# Patient Record
Sex: FEMALE | Race: WHITE | NOT HISPANIC OR LATINO | URBAN - METROPOLITAN AREA
[De-identification: names, ages, dates, MRNs, and addresses within clinical notes are randomized per-mention and may not be internally consistent; named-entity substitution may affect disease eponyms.]

---

## 2020-12-23 ENCOUNTER — OFFICE VISIT (OUTPATIENT)
Dept: URBAN - METROPOLITAN AREA TELEHEALTH 2 | Facility: TELEHEALTH | Age: 55
End: 2020-12-23

## 2020-12-23 RX ORDER — SPIRONOLACTONE 50 MG/1
TABLET, FILM COATED ORAL
Qty: 0 | Refills: 0 | Status: ACTIVE | COMMUNITY
Start: 1900-01-01 | End: 1900-01-01

## 2020-12-23 RX ORDER — LEVOTHYROXINE SODIUM 137 UG/1
TABLET ORAL
Qty: 0 | Refills: 0 | Status: ACTIVE | COMMUNITY
Start: 1900-01-01 | End: 1900-01-01

## 2020-12-23 RX ORDER — ATORVASTATIN CALCIUM 10 MG/1
TABLET, FILM COATED ORAL
Qty: 0 | Refills: 0 | Status: ACTIVE | COMMUNITY
Start: 1900-01-01 | End: 1900-01-01

## 2021-01-21 ENCOUNTER — OFFICE VISIT (OUTPATIENT)
Dept: URBAN - METROPOLITAN AREA CLINIC 98 | Facility: CLINIC | Age: 56
End: 2021-01-21
Payer: COMMERCIAL

## 2021-01-21 DIAGNOSIS — Z00.00 ROUTINE CHECK-UP: ICD-10-CM

## 2021-01-21 DIAGNOSIS — Z76.0 MEDICATION REFILL: ICD-10-CM

## 2021-01-21 DIAGNOSIS — C73 THYROID CANCER: ICD-10-CM

## 2021-01-21 DIAGNOSIS — K50.90 CROHN DISEASE: ICD-10-CM

## 2021-01-21 PROBLEM — 363478007 THYROID CANCER: Status: ACTIVE | Noted: 2021-01-21

## 2021-01-21 PROBLEM — 34000006 CROHN DISEASE: Status: ACTIVE | Noted: 2021-01-21

## 2021-01-21 PROCEDURE — 99214 OFFICE O/P EST MOD 30 MIN: CPT | Performed by: INTERNAL MEDICINE

## 2021-01-21 RX ORDER — ATORVASTATIN CALCIUM 10 MG/1
TABLET, FILM COATED ORAL
Qty: 0 | Refills: 0 | Status: ACTIVE | COMMUNITY
Start: 1900-01-01

## 2021-01-21 RX ORDER — LEVOTHYROXINE SODIUM 137 UG/1
TABLET ORAL
Qty: 0 | Refills: 0 | Status: ACTIVE | COMMUNITY
Start: 1900-01-01

## 2021-01-21 RX ORDER — SPIRONOLACTONE 50 MG/1
TABLET, FILM COATED ORAL
Qty: 0 | Refills: 0 | Status: ACTIVE | COMMUNITY
Start: 1900-01-01

## 2021-01-21 NOTE — HPI-OTHER HISTORIES
54 yo female with Crohn's disease dx 1998 by Dr. Boothe or someone else. No surgery and no hospitalizations. Took Asacol for many years. Does not recall why it is CD and not UC. No h/o fistula and last colon 11/25/19 showed patchy colitis and no TI involvement.  No anemia.  Thyroid cancer dx 2010, first Dr. Seo and then Dr. Nata Hartmann. Low grade. Surgical tx and radioactive pill. Sees Dr. Hartmann once a year. [place]. [place]. Law Seo - is primary MD  [facility].  Apriso 2 per day - gets from [facility] in [place]. Needs refill.  No other medical issues. Recent menopaus. Working from home. Gained 30 lb in a year. Should address with primary or Dr. Hartmann.  Minor heartburn, better with Tums.  New syx. ??with weight gain.  Gets colon q 18 months.  will do egd and colon.  Need Dr. Seo and Dr. Hartmann and dietician to work on weight reduction - will defer to them.  Goes

## 2021-03-30 ENCOUNTER — TELEPHONE ENCOUNTER (OUTPATIENT)
Dept: URBAN - METROPOLITAN AREA CLINIC 98 | Facility: CLINIC | Age: 56
End: 2021-03-30

## 2021-03-30 RX ORDER — MESALAMINE 1.2 G/1
2 TABLETS TABLET, DELAYED RELEASE ORAL ONCE A DAY
Qty: 180 TABLETS | Refills: 3 | OUTPATIENT
Start: 2021-03-30 | End: 2022-03-25

## 2021-03-30 RX ORDER — MESALAMINE 1.2 G/1
2 TABLETS TABLET, DELAYED RELEASE ORAL ONCE A DAY
Qty: 180 TABLET | Refills: 3 | OUTPATIENT
Start: 2021-03-30 | End: 2022-03-25

## 2021-03-30 RX ORDER — MESALAMINE 375 MG/1
TAKE 2 CAPSULES CAPSULE, EXTENDED RELEASE ORAL ONCE A DAY
Qty: 180 CAPSULES | Refills: 3 | OUTPATIENT
Start: 2021-03-30 | End: 2022-03-25

## 2021-03-30 RX ORDER — MESALAMINE 1.2 G/1
2 TABLETS TABLET, DELAYED RELEASE ORAL ONCE A DAY
Qty: 60 | OUTPATIENT
Start: 2021-03-30 | End: 2021-04-29

## 2021-12-21 ENCOUNTER — OFFICE VISIT (OUTPATIENT)
Dept: URBAN - METROPOLITAN AREA CLINIC 96 | Facility: CLINIC | Age: 56
End: 2021-12-21

## 2022-02-01 ENCOUNTER — WEB ENCOUNTER (OUTPATIENT)
Dept: URBAN - METROPOLITAN AREA CLINIC 96 | Facility: CLINIC | Age: 57
End: 2022-02-01

## 2022-02-01 ENCOUNTER — LAB OUTSIDE AN ENCOUNTER (OUTPATIENT)
Dept: URBAN - METROPOLITAN AREA CLINIC 96 | Facility: CLINIC | Age: 57
End: 2022-02-01

## 2022-02-01 ENCOUNTER — OFFICE VISIT (OUTPATIENT)
Dept: URBAN - METROPOLITAN AREA CLINIC 96 | Facility: CLINIC | Age: 57
End: 2022-02-01
Payer: COMMERCIAL

## 2022-02-01 DIAGNOSIS — K50.80 CROHN'S COLITIS: ICD-10-CM

## 2022-02-01 DIAGNOSIS — Z91.89 COLON CANCER HIGH RISK: ICD-10-CM

## 2022-02-01 PROCEDURE — 99215 OFFICE O/P EST HI 40 MIN: CPT | Performed by: INTERNAL MEDICINE

## 2022-02-01 RX ORDER — MESALAMINE 375 MG/1
TAKE 2 CAPSULES CAPSULE, EXTENDED RELEASE ORAL ONCE A DAY
Qty: 180 CAPSULES | Refills: 3 | Status: DISCONTINUED | COMMUNITY
Start: 2021-03-30 | End: 2022-03-25

## 2022-02-01 RX ORDER — BALSALAZIDE DISODIUM 750 MG/1
6 CAPSULES CAPSULE ORAL QD
Qty: 180 CAPSULE | Refills: 6 | OUTPATIENT
Start: 2022-02-01 | End: 2022-08-30

## 2022-02-01 RX ORDER — MESALAMINE 1.2 G/1
2 TABLETS TABLET, DELAYED RELEASE ORAL ONCE A DAY
Qty: 180 TABLETS | Refills: 3 | COMMUNITY
Start: 2021-03-30 | End: 2022-03-25

## 2022-02-01 RX ORDER — SPIRONOLACTONE 50 MG/1
TABLET, FILM COATED ORAL
Qty: 0 | Refills: 0 | COMMUNITY
Start: 1900-01-01

## 2022-02-01 RX ORDER — LEVOTHYROXINE SODIUM 137 UG/1
TABLET ORAL
Qty: 0 | Refills: 0 | COMMUNITY
Start: 1900-01-01

## 2022-02-01 RX ORDER — MESALAMINE 1.2 G/1
2 TABLETS TABLET, DELAYED RELEASE ORAL ONCE A DAY
Qty: 180 TABLET | Refills: 3 | COMMUNITY
Start: 2021-03-30 | End: 2022-03-25

## 2022-02-01 RX ORDER — POLYETHYLENE GLYCOL 3350, SODIUM SULFATE, SODIUM CHLORIDE, POTASSIUM CHLORIDE, ASCORBIC ACID, SODIUM ASCORBATE 140-9-5.2G
1 KIT KIT ORAL ONCE
Qty: 1 | Refills: 1 | OUTPATIENT
Start: 2022-02-01 | End: 2022-02-03

## 2022-02-01 RX ORDER — ATORVASTATIN CALCIUM 10 MG/1
TABLET, FILM COATED ORAL
Qty: 0 | Refills: 0 | COMMUNITY
Start: 1900-01-01

## 2022-02-01 NOTE — HPI-TODAY'S VISIT:
Pablo Khan is a 57 y/o indiv with colonic CD, currently on Apriso (2 tab daily). . Pt is referred by Dr. Boothe. . Pt was dxd with CD in 1998.  She was started on Asacol and then eventually started on Apriso. No biologicals. No surgery. . Today on 2/1/2022, pt reports that she has 1 BM per day, no blood in the stool, no abdominal pain. . Colonoscopy:  12/2019: A.	Terminal	I leum	,	Biopsy: -No	diagnostic	abnormality. -Normal	villous	architecture. -There	is	no	evidence	of	active	or	chronic	enteritis. -Negative	for	dysplasia	and	malignancy. B.	Random	Colon	,	Biopsy: -Patchy	chronic	active	colitis,	mild	(see	comment). -Negative	for	dysplasia	or	malignancy.	 C.	Descending	Colon	,	Biopsy: -Chronic	active	colitis,	mild	(see	comment). -Negative	for	dysplasia	or	malignancy.	 . 3/2018: A. CECUM, BIOPSY: -Architectural and metaplastic changes consistent with history of Crohn disease. -No evidence of active colitis. -No evidence of dysplasia or malignancy. B. TERMINAL ILEUM, BIOPSY: -Focal active enteritis, consistent with history of Crohn disease. -There is no evidence of dysplasia or malignancy. C. TRANSVERSE COLON, BIOPSY: -Patchy chronic active colitis, consistent with history of Crohn disease.  -Single granuloma identified. -No pathogenic microorganisms or viral cytopathic effect. -No dysplasia or malignancy. D. DESCENDING COLON, BIOPSY: -Architectural and metaplastic changes consistent with history of Crohn disease. -No evidence of active colitis. -No evidence of dysplasia or malignancy. E. RECTUM, BIOPSY:  -Architectural and metaplastic changes consistent with history of Crohn disease. -No evidence of active colitis. -No evidence of dysplasia or malignancy. . 2015: Diagnosis: A. TERMINAL ILEUM-BIOPSY ILEAL MUCOSA WITH NO SIGNIFICANT HISTOPATHOLOGIC CHANGES.  NEGATIVE FOR ILEITIS,  GRANULOMAS, CRYPT DISTORTION/DISARRAY, AND DYSPLASIA. B. CECUM-BIOPSY MILDLY ACTIVE CHRONIC COLITIS WITH GRANULOMA FORMATION CONSISTENT WITH CROHN'S  DISEASE.  NEGATIVE FOR DYSPLASIA. C. ASCENDING COLON-BIOPSY MILDLY ACTIVE CHRONIC COLITIS WITH GRANULOMA FORMATION CONSISTENT WITH CROHN'S  DISEASE.  NEGATIVE FOR DYSPLASIA. D. DESCENDING COLON-BIOPSY UNREMARKABLE COLONIC MUCOSA WITH NO CRYPT DISTORTION, DISARRAY, OR SIGNIFICANT ACTIVE  INFLAMMATION.  NEGATIVE FOR GRANULOMAS AND DYSPLASIA. E. RECTUM-BIOPSY UNREMARKABLE COLONIC MUCOSA WITH NO CRYPT DISTORTION, DISARRAY, OR SIGNIFICANT ACTIVE  INFLAMMATION.  NEGATIVE FOR GRANULOMAS AND DYSPLASIA. . 12/2009: chronic active colitis throughout the entire colon.

## 2022-07-14 ENCOUNTER — OFFICE VISIT (OUTPATIENT)
Dept: URBAN - METROPOLITAN AREA SURGERY CENTER 18 | Facility: SURGERY CENTER | Age: 57
End: 2022-07-14

## 2022-07-25 PROBLEM — 71833008 CROHN'S DISEASE OF SMALL AND LARGE INTESTINES: Status: ACTIVE | Noted: 2022-02-01

## 2022-08-11 ENCOUNTER — OFFICE VISIT (OUTPATIENT)
Dept: URBAN - METROPOLITAN AREA SURGERY CENTER 18 | Facility: SURGERY CENTER | Age: 57
End: 2022-08-11
Payer: COMMERCIAL

## 2022-08-11 DIAGNOSIS — K50.80 CROHN'S COLITIS: ICD-10-CM

## 2022-08-11 PROCEDURE — 45380 COLONOSCOPY AND BIOPSY: CPT | Performed by: INTERNAL MEDICINE

## 2022-08-11 PROCEDURE — G8907 PT DOC NO EVENTS ON DISCHARG: HCPCS | Performed by: INTERNAL MEDICINE

## 2022-08-11 RX ORDER — SPIRONOLACTONE 50 MG/1
TABLET, FILM COATED ORAL
Qty: 0 | Refills: 0 | COMMUNITY
Start: 1900-01-01

## 2022-08-11 RX ORDER — ATORVASTATIN CALCIUM 10 MG/1
TABLET, FILM COATED ORAL
Qty: 0 | Refills: 0 | COMMUNITY
Start: 1900-01-01

## 2022-08-11 RX ORDER — LEVOTHYROXINE SODIUM 137 UG/1
TABLET ORAL
Qty: 0 | Refills: 0 | COMMUNITY
Start: 1900-01-01

## 2022-08-11 RX ORDER — BALSALAZIDE DISODIUM 750 MG/1
6 CAPSULES CAPSULE ORAL QD
Qty: 180 CAPSULE | Refills: 6 | Status: ACTIVE | COMMUNITY
Start: 2022-02-01 | End: 2022-08-30

## 2023-02-15 ENCOUNTER — WEB ENCOUNTER (OUTPATIENT)
Dept: URBAN - METROPOLITAN AREA CLINIC 98 | Facility: CLINIC | Age: 58
End: 2023-02-15

## 2023-02-15 RX ORDER — BALSALAZIDE DISODIUM 750 MG/1
6 CAPSULES CAPSULE ORAL QD
Qty: 180 CAPSULE | Refills: 11

## 2024-04-22 ENCOUNTER — LAB (OUTPATIENT)
Dept: URBAN - METROPOLITAN AREA TELEHEALTH 2 | Facility: TELEHEALTH | Age: 59
End: 2024-04-22

## 2024-04-22 ENCOUNTER — TELEP (OUTPATIENT)
Dept: URBAN - METROPOLITAN AREA TELEHEALTH 2 | Facility: TELEHEALTH | Age: 59
End: 2024-04-22
Payer: COMMERCIAL

## 2024-04-22 DIAGNOSIS — K50.80 CROHN'S COLITIS: ICD-10-CM

## 2024-04-22 DIAGNOSIS — Z91.89 AT RISK FOR COLON CANCER: ICD-10-CM

## 2024-04-22 PROCEDURE — 99213 OFFICE O/P EST LOW 20 MIN: CPT | Performed by: INTERNAL MEDICINE

## 2024-04-22 RX ORDER — LEVOTHYROXINE SODIUM 137 UG/1
TABLET ORAL
Qty: 0 | Refills: 0 | Status: ACTIVE | COMMUNITY
Start: 1900-01-01

## 2024-04-22 RX ORDER — ATORVASTATIN CALCIUM 10 MG/1
TABLET, FILM COATED ORAL
Qty: 0 | Refills: 0 | Status: ACTIVE | COMMUNITY
Start: 1900-01-01

## 2024-04-22 RX ORDER — SPIRONOLACTONE 50 MG/1
TABLET, FILM COATED ORAL
Qty: 0 | Refills: 0 | Status: ACTIVE | COMMUNITY
Start: 1900-01-01

## 2024-04-22 RX ORDER — BALSALAZIDE DISODIUM 750 MG/1
4 CAPSULES CAPSULE ORAL ONCE DAILY
Qty: 360 | Refills: 4 | OUTPATIENT
Start: 2024-04-22 | End: 2025-07-16

## 2024-04-22 RX ORDER — BALSALAZIDE DISODIUM 750 MG/1
6 CAPSULES CAPSULE ORAL DAILY
Qty: 540 CAPSULE | Refills: 0 | Status: ACTIVE | COMMUNITY
End: 2024-06-09

## 2024-04-22 RX ORDER — ONDANSETRON HYDROCHLORIDE 4 MG/1
1 TABLET TABLET, FILM COATED ORAL EVERY 4 HOURS PRN
Qty: 3 | Refills: 0 | OUTPATIENT
Start: 2024-04-22

## 2024-04-22 RX ORDER — SODIUM, POTASSIUM,MAG SULFATES 17.5-3.13G
TAKE 177 ML SOLUTION, RECONSTITUTED, ORAL ORAL
Qty: 177 ML | Refills: 0 | OUTPATIENT
Start: 2024-04-22 | End: 2024-04-23

## 2024-04-22 NOTE — PHYSICAL EXAM HENT:
Head, normocephalic, atraumatic, Face, Face within normal limits, Ears, External ears within normal limits, Nose/Nasopharynx, External nose normal appearance, nares patent, no nasal discharge, Mouth and Throat, Oral cavity appearance normal, Lips, Appearance normal , Head, normocephalic, atraumatic, Face, Face within normal limits, Ears, External ears within normal limits, Nose/Nasopharynx, External nose normal appearance, nares patent, no nasal discharge, Mouth and Throat, Oral cavity appearance normal, Lips, Appearance normal
n/a

## 2024-04-22 NOTE — HPI-TODAY'S VISIT:
Pablo Khan is a 57 y/o indiv with colonic CD, currently on Colazol (4 tab daily) . Pt is referred by Dr. Boothe. . Pt was dxd with CD in 1998.  She was started on Asacol and then eventually started on Apriso. No biologicals. No surgery. . Previously on 2/1/2022, pt reports that she has 1 BM per day, no blood in the stool, no abdominal pain. . Today on 4/22/2024, pt reports that she is doing well.   . Colonoscopy:  3/2022 A Ascending colon, biopsyColonic mucosa with no significant histopathology.No histologic evidence of active colitis, granulomata or dysplasia.B Transverse colon, biopsyColonic mucosa with no significant histopathology.No histologic evidence of active colitis, granulomata or dysplasia.C Descending colon, biopsyColonic mucosa with no significant histopathology.No histologic evidence of active colitis, granulomata or dysplasia.D Sigmoid colon, biopsyColonic mucosa with no significant histopathology.No histologic evidence of active colitis, granulomata or dysplasia.ERectum, biopsyColonic mucosa with no significant histopathology.No histologic evidence of active colitis, granulomata or dysplasia. . 12/2019: A.	Terminal	I leum	,	Biopsy: -No	diagnostic	abnormality. -Normal	villous	architecture. -There	is	no	evidence	of	active	or	chronic	enteritis. -Negative	for	dysplasia	and	malignancy. B.	Random	Colon	,	Biopsy: -Patchy	chronic	active	colitis,	mild	(see	comment). -Negative	for	dysplasia	or	malignancy.	 C.	Descending	Colon	,	Biopsy: -Chronic	active	colitis,	mild	(see	comment). -Negative	for	dysplasia	or	malignancy.	 . 3/2018: A. CECUM, BIOPSY: -Architectural and metaplastic changes consistent with history of Crohn disease. -No evidence of active colitis. -No evidence of dysplasia or malignancy. B. TERMINAL ILEUM, BIOPSY: -Focal active enteritis, consistent with history of Crohn disease. -There is no evidence of dysplasia or malignancy. C. TRANSVERSE COLON, BIOPSY: -Patchy chronic active colitis, consistent with history of Crohn disease.  -Single granuloma identified. -No pathogenic microorganisms or viral cytopathic effect. -No dysplasia or malignancy. D. DESCENDING COLON, BIOPSY: -Architectural and metaplastic changes consistent with history of Crohn disease. -No evidence of active colitis. -No evidence of dysplasia or malignancy. E. RECTUM, BIOPSY:  -Architectural and metaplastic changes consistent with history of Crohn disease. -No evidence of active colitis. -No evidence of dysplasia or malignancy. . 2015: Diagnosis: A. TERMINAL ILEUM-BIOPSY ILEAL MUCOSA WITH NO SIGNIFICANT HISTOPATHOLOGIC CHANGES.  NEGATIVE FOR ILEITIS,  GRANULOMAS, CRYPT DISTORTION/DISARRAY, AND DYSPLASIA. B. CECUM-BIOPSY MILDLY ACTIVE CHRONIC COLITIS WITH GRANULOMA FORMATION CONSISTENT WITH CROHN'S  DISEASE.  NEGATIVE FOR DYSPLASIA. C. ASCENDING COLON-BIOPSY MILDLY ACTIVE CHRONIC COLITIS WITH GRANULOMA FORMATION CONSISTENT WITH CROHN'S  DISEASE.  NEGATIVE FOR DYSPLASIA. D. DESCENDING COLON-BIOPSY UNREMARKABLE COLONIC MUCOSA WITH NO CRYPT DISTORTION, DISARRAY, OR SIGNIFICANT ACTIVE  INFLAMMATION.  NEGATIVE FOR GRANULOMAS AND DYSPLASIA. E. RECTUM-BIOPSY UNREMARKABLE COLONIC MUCOSA WITH NO CRYPT DISTORTION, DISARRAY, OR SIGNIFICANT ACTIVE  INFLAMMATION.  NEGATIVE FOR GRANULOMAS AND DYSPLASIA. . 12/2009: chronic active colitis throughout the entire colon.

## 2024-07-12 ENCOUNTER — OFFICE VISIT (OUTPATIENT)
Dept: URBAN - METROPOLITAN AREA SURGERY CENTER 18 | Facility: SURGERY CENTER | Age: 59
End: 2024-07-12
Payer: COMMERCIAL

## 2024-07-12 ENCOUNTER — CLAIMS CREATED FROM THE CLAIM WINDOW (OUTPATIENT)
Dept: URBAN - METROPOLITAN AREA CLINIC 4 | Facility: CLINIC | Age: 59
End: 2024-07-12
Payer: COMMERCIAL

## 2024-07-12 DIAGNOSIS — K51.90 ULCERATIVE COLITIS: ICD-10-CM

## 2024-07-12 DIAGNOSIS — K51.00 ACUTE ULCERATIVE PANCOLITIS: ICD-10-CM

## 2024-07-12 DIAGNOSIS — K63.89 OTHER SPECIFIED DISEASES OF INTESTINE: ICD-10-CM

## 2024-07-12 DIAGNOSIS — K51.00 ULCERATIVE PANCOLITIS: ICD-10-CM

## 2024-07-12 DIAGNOSIS — Z12.11 COLON CANCER SCREENING (HIGH RISK): ICD-10-CM

## 2024-07-12 PROCEDURE — 88305 TISSUE EXAM BY PATHOLOGIST: CPT | Performed by: PATHOLOGY

## 2024-07-12 PROCEDURE — 45380 COLONOSCOPY AND BIOPSY: CPT | Performed by: INTERNAL MEDICINE

## 2024-07-12 PROCEDURE — 00812 ANES LWR INTST SCR COLSC: CPT | Performed by: NURSE ANESTHETIST, CERTIFIED REGISTERED

## 2024-07-12 RX ORDER — LEVOTHYROXINE SODIUM 137 UG/1
TABLET ORAL
Qty: 0 | Refills: 0 | Status: ACTIVE | COMMUNITY
Start: 1900-01-01

## 2024-07-12 RX ORDER — BALSALAZIDE DISODIUM 750 MG/1
4 CAPSULES CAPSULE ORAL ONCE DAILY
Qty: 360 | Refills: 4 | Status: ACTIVE | COMMUNITY
Start: 2024-04-22 | End: 2025-07-16

## 2024-07-12 RX ORDER — SPIRONOLACTONE 50 MG/1
TABLET, FILM COATED ORAL
Qty: 0 | Refills: 0 | Status: ACTIVE | COMMUNITY
Start: 1900-01-01

## 2024-07-12 RX ORDER — ONDANSETRON HYDROCHLORIDE 4 MG/1
1 TABLET TABLET, FILM COATED ORAL EVERY 4 HOURS PRN
Qty: 3 | Refills: 0 | Status: ACTIVE | COMMUNITY
Start: 2024-04-22

## 2024-07-12 RX ORDER — ATORVASTATIN CALCIUM 10 MG/1
TABLET, FILM COATED ORAL
Qty: 0 | Refills: 0 | Status: ACTIVE | COMMUNITY
Start: 1900-01-01

## 2024-07-12 NOTE — HPI-TODAY'S VISIT:
Pablo Khan is a 59 y/o indiv with colonic CD, currently on Colazol (4 tab daily) . Pt is referred by Dr. Booteh. . Pt was dxd with CD in 1998.  She was started on Asacol and then eventually started on Apriso. No biologicals. No surgery. . Previously on 2/1/2022, pt reports that she has 1 BM per day, no blood in the stool, no abdominal pain. . Today on 4/22/2024, pt reports that she is doing well.   . Colonoscopy:  3/2022 A Ascending colon, biopsyColonic mucosa with no significant histopathology.No histologic evidence of active colitis, granulomata or dysplasia.B Transverse colon, biopsyColonic mucosa with no significant histopathology.No histologic evidence of active colitis, granulomata or dysplasia.C Descending colon, biopsyColonic mucosa with no significant histopathology.No histologic evidence of active colitis, granulomata or dysplasia.D Sigmoid colon, biopsyColonic mucosa with no significant histopathology.No histologic evidence of active colitis, granulomata or dysplasia.ERectum, biopsyColonic mucosa with no significant histopathology.No histologic evidence of active colitis, granulomata or dysplasia. . 12/2019: A.	Terminal	I leum	,	Biopsy: -No	diagnostic	abnormality. -Normal	villous	architecture. -There	is	no	evidence	of	active	or	chronic	enteritis. -Negative	for	dysplasia	and	malignancy. B.	Random	Colon	,	Biopsy: -Patchy	chronic	active	colitis,	mild	(see	comment). -Negative	for	dysplasia	or	malignancy.	 C.	Descending	Colon	,	Biopsy: -Chronic	active	colitis,	mild	(see	comment). -Negative	for	dysplasia	or	malignancy.	 . 3/2018: A. CECUM, BIOPSY: -Architectural and metaplastic changes consistent with history of Crohn disease. -No evidence of active colitis. -No evidence of dysplasia or malignancy. B. TERMINAL ILEUM, BIOPSY: -Focal active enteritis, consistent with history of Crohn disease. -There is no evidence of dysplasia or malignancy. C. TRANSVERSE COLON, BIOPSY: -Patchy chronic active colitis, consistent with history of Crohn disease.  -Single granuloma identified. -No pathogenic microorganisms or viral cytopathic effect. -No dysplasia or malignancy. D. DESCENDING COLON, BIOPSY: -Architectural and metaplastic changes consistent with history of Crohn disease. -No evidence of active colitis. -No evidence of dysplasia or malignancy. E. RECTUM, BIOPSY:  -Architectural and metaplastic changes consistent with history of Crohn disease. -No evidence of active colitis. -No evidence of dysplasia or malignancy. . 2015: Diagnosis: A. TERMINAL ILEUM-BIOPSY ILEAL MUCOSA WITH NO SIGNIFICANT HISTOPATHOLOGIC CHANGES.  NEGATIVE FOR ILEITIS,  GRANULOMAS, CRYPT DISTORTION/DISARRAY, AND DYSPLASIA. B. CECUM-BIOPSY MILDLY ACTIVE CHRONIC COLITIS WITH GRANULOMA FORMATION CONSISTENT WITH CROHN'S  DISEASE.  NEGATIVE FOR DYSPLASIA. C. ASCENDING COLON-BIOPSY MILDLY ACTIVE CHRONIC COLITIS WITH GRANULOMA FORMATION CONSISTENT WITH CROHN'S  DISEASE.  NEGATIVE FOR DYSPLASIA. D. DESCENDING COLON-BIOPSY UNREMARKABLE COLONIC MUCOSA WITH NO CRYPT DISTORTION, DISARRAY, OR SIGNIFICANT ACTIVE  INFLAMMATION.  NEGATIVE FOR GRANULOMAS AND DYSPLASIA. E. RECTUM-BIOPSY UNREMARKABLE COLONIC MUCOSA WITH NO CRYPT DISTORTION, DISARRAY, OR SIGNIFICANT ACTIVE  INFLAMMATION.  NEGATIVE FOR GRANULOMAS AND DYSPLASIA. . 12/2009: chronic active colitis throughout the entire colon.

## 2024-07-18 ENCOUNTER — WEB ENCOUNTER (OUTPATIENT)
Dept: URBAN - METROPOLITAN AREA CLINIC 96 | Facility: CLINIC | Age: 59
End: 2024-07-18

## 2024-07-18 RX ORDER — BALSALAZIDE DISODIUM 750 MG/1
6 CAPSULES CAPSULE ORAL ONCE DAILY
Qty: 540 | Refills: 4
Start: 2024-04-22 | End: 2025-10-22

## 2024-07-25 ENCOUNTER — WEB ENCOUNTER (OUTPATIENT)
Dept: URBAN - METROPOLITAN AREA CLINIC 96 | Facility: CLINIC | Age: 59
End: 2024-07-25

## 2024-07-25 RX ORDER — BALSALAZIDE DISODIUM 750 MG/1
6 CAPSULES CAPSULE ORAL ONCE DAILY
Qty: 540 | Refills: 4
Start: 2024-04-22 | End: 2025-10-22

## 2025-07-18 ENCOUNTER — TELEPHONE ENCOUNTER (OUTPATIENT)
Dept: URBAN - METROPOLITAN AREA CLINIC 96 | Facility: CLINIC | Age: 60
End: 2025-07-18

## 2025-07-18 RX ORDER — BALSALAZIDE DISODIUM 750 MG/1
6 CAPSULES CAPSULE ORAL ONCE DAILY
Qty: 540 | Refills: 4
Start: 2024-04-22